# Patient Record
Sex: MALE | Race: WHITE | NOT HISPANIC OR LATINO | Employment: FULL TIME | URBAN - METROPOLITAN AREA
[De-identification: names, ages, dates, MRNs, and addresses within clinical notes are randomized per-mention and may not be internally consistent; named-entity substitution may affect disease eponyms.]

---

## 2024-07-19 ENCOUNTER — HOSPITAL ENCOUNTER (EMERGENCY)
Facility: HOSPITAL | Age: 64
Discharge: HOME | End: 2024-07-19
Attending: EMERGENCY MEDICINE

## 2024-07-19 VITALS
OXYGEN SATURATION: 97 % | RESPIRATION RATE: 17 BRPM | HEART RATE: 62 BPM | BODY MASS INDEX: 28 KG/M2 | DIASTOLIC BLOOD PRESSURE: 73 MMHG | HEIGHT: 70 IN | TEMPERATURE: 98 F | WEIGHT: 195.56 LBS | SYSTOLIC BLOOD PRESSURE: 118 MMHG

## 2024-07-19 DIAGNOSIS — R19.7 NAUSEA VOMITING AND DIARRHEA: Primary | ICD-10-CM

## 2024-07-19 DIAGNOSIS — R11.2 NAUSEA VOMITING AND DIARRHEA: Primary | ICD-10-CM

## 2024-07-19 DIAGNOSIS — R42 DIZZINESS: ICD-10-CM

## 2024-07-19 LAB
ALBUMIN SERPL BCP-MCNC: 3.6 G/DL (ref 3.5–5.2)
ALP SERPL-CCNC: 54 U/L (ref 55–135)
ALT SERPL W/O P-5'-P-CCNC: 31 U/L (ref 10–44)
ANION GAP SERPL CALC-SCNC: 10 MMOL/L (ref 8–16)
AST SERPL-CCNC: 37 U/L (ref 10–40)
BACTERIA #/AREA URNS HPF: NORMAL /HPF
BASOPHILS # BLD AUTO: 0.01 K/UL (ref 0–0.2)
BASOPHILS NFR BLD: 0.2 % (ref 0–1.9)
BILIRUB SERPL-MCNC: 0.6 MG/DL (ref 0.1–1)
BILIRUB UR QL STRIP: NEGATIVE
BUN SERPL-MCNC: 12 MG/DL (ref 8–23)
CALCIUM SERPL-MCNC: 9 MG/DL (ref 8.7–10.5)
CHLORIDE SERPL-SCNC: 103 MMOL/L (ref 95–110)
CK SERPL-CCNC: 394 U/L (ref 20–200)
CLARITY UR: CLEAR
CO2 SERPL-SCNC: 21 MMOL/L (ref 23–29)
COLOR UR: YELLOW
CREAT SERPL-MCNC: 0.8 MG/DL (ref 0.5–1.4)
DIFFERENTIAL METHOD BLD: ABNORMAL
EOSINOPHIL # BLD AUTO: 0 K/UL (ref 0–0.5)
EOSINOPHIL NFR BLD: 0.2 % (ref 0–8)
ERYTHROCYTE [DISTWIDTH] IN BLOOD BY AUTOMATED COUNT: 13.2 % (ref 11.5–14.5)
EST. GFR  (NO RACE VARIABLE): >60 ML/MIN/1.73 M^2
GLUCOSE SERPL-MCNC: 174 MG/DL (ref 70–110)
GLUCOSE UR QL STRIP: NEGATIVE
HCT VFR BLD AUTO: 40.9 % (ref 40–54)
HGB BLD-MCNC: 13.7 G/DL (ref 14–18)
HGB UR QL STRIP: NEGATIVE
HYALINE CASTS #/AREA URNS LPF: 0 /LPF
IMM GRANULOCYTES # BLD AUTO: 0.02 K/UL (ref 0–0.04)
IMM GRANULOCYTES NFR BLD AUTO: 0.4 % (ref 0–0.5)
KETONES UR QL STRIP: NEGATIVE
LEUKOCYTE ESTERASE UR QL STRIP: NEGATIVE
LYMPHOCYTES # BLD AUTO: 0.8 K/UL (ref 1–4.8)
LYMPHOCYTES NFR BLD: 15.6 % (ref 18–48)
MCH RBC QN AUTO: 30.9 PG (ref 27–31)
MCHC RBC AUTO-ENTMCNC: 33.5 G/DL (ref 32–36)
MCV RBC AUTO: 92 FL (ref 82–98)
MICROSCOPIC COMMENT: NORMAL
MONOCYTES # BLD AUTO: 0.4 K/UL (ref 0.3–1)
MONOCYTES NFR BLD: 8.2 % (ref 4–15)
NEUTROPHILS # BLD AUTO: 3.7 K/UL (ref 1.8–7.7)
NEUTROPHILS NFR BLD: 75.4 % (ref 38–73)
NITRITE UR QL STRIP: NEGATIVE
NRBC BLD-RTO: 0 /100 WBC
PH UR STRIP: 7 [PH] (ref 5–8)
PLATELET # BLD AUTO: 150 K/UL (ref 150–450)
PMV BLD AUTO: 10.8 FL (ref 9.2–12.9)
POTASSIUM SERPL-SCNC: 4.4 MMOL/L (ref 3.5–5.1)
PROT SERPL-MCNC: 6.6 G/DL (ref 6–8.4)
PROT UR QL STRIP: ABNORMAL
RBC # BLD AUTO: 4.44 M/UL (ref 4.6–6.2)
RBC #/AREA URNS HPF: 0 /HPF (ref 0–4)
SODIUM SERPL-SCNC: 134 MMOL/L (ref 136–145)
SP GR UR STRIP: >1.03 (ref 1–1.03)
URN SPEC COLLECT METH UR: ABNORMAL
UROBILINOGEN UR STRIP-ACNC: NEGATIVE EU/DL
WBC # BLD AUTO: 4.87 K/UL (ref 3.9–12.7)
WBC #/AREA URNS HPF: 0 /HPF (ref 0–5)

## 2024-07-19 PROCEDURE — 63600175 PHARM REV CODE 636 W HCPCS: Performed by: NURSE PRACTITIONER

## 2024-07-19 PROCEDURE — 81000 URINALYSIS NONAUTO W/SCOPE: CPT | Performed by: NURSE PRACTITIONER

## 2024-07-19 PROCEDURE — 85025 COMPLETE CBC W/AUTO DIFF WBC: CPT | Performed by: NURSE PRACTITIONER

## 2024-07-19 PROCEDURE — 80053 COMPREHEN METABOLIC PANEL: CPT | Performed by: NURSE PRACTITIONER

## 2024-07-19 PROCEDURE — 99285 EMERGENCY DEPT VISIT HI MDM: CPT | Mod: 25

## 2024-07-19 PROCEDURE — 96374 THER/PROPH/DIAG INJ IV PUSH: CPT

## 2024-07-19 PROCEDURE — 25000003 PHARM REV CODE 250: Performed by: NURSE PRACTITIONER

## 2024-07-19 PROCEDURE — 82550 ASSAY OF CK (CPK): CPT | Performed by: NURSE PRACTITIONER

## 2024-07-19 PROCEDURE — 96361 HYDRATE IV INFUSION ADD-ON: CPT

## 2024-07-19 RX ORDER — BETAMETHASONE DIPROPIONATE 0.5 MG/G
1 OINTMENT, AUGMENTED TOPICAL 2 TIMES DAILY
COMMUNITY
Start: 2024-04-24

## 2024-07-19 RX ORDER — ALBUTEROL SULFATE 90 UG/1
AEROSOL, METERED RESPIRATORY (INHALATION)
COMMUNITY

## 2024-07-19 RX ORDER — TRETINOIN 0.5 MG/G
CREAM TOPICAL
COMMUNITY
Start: 2024-04-12

## 2024-07-19 RX ORDER — LISINOPRIL 40 MG/1
40 TABLET ORAL
COMMUNITY

## 2024-07-19 RX ORDER — SILDENAFIL CITRATE 20 MG/1
TABLET ORAL
COMMUNITY
Start: 2024-06-18

## 2024-07-19 RX ORDER — ONDANSETRON 4 MG/1
4 TABLET, ORALLY DISINTEGRATING ORAL EVERY 6 HOURS PRN
Qty: 12 TABLET | Refills: 0 | Status: SHIPPED | OUTPATIENT
Start: 2024-07-19

## 2024-07-19 RX ORDER — NITROGLYCERIN 0.4 MG/1
0.4 TABLET SUBLINGUAL EVERY 5 MIN PRN
COMMUNITY
Start: 2023-12-07

## 2024-07-19 RX ORDER — ALFUZOSIN HYDROCHLORIDE 10 MG/1
10 TABLET, EXTENDED RELEASE ORAL
COMMUNITY
Start: 2024-05-13

## 2024-07-19 RX ORDER — VILOXAZINE HYDROCHLORIDE 200 MG/1
1 CAPSULE, EXTENDED RELEASE ORAL
COMMUNITY
Start: 2024-06-20

## 2024-07-19 RX ORDER — ONDANSETRON HYDROCHLORIDE 2 MG/ML
4 INJECTION, SOLUTION INTRAVENOUS
Status: COMPLETED | OUTPATIENT
Start: 2024-07-19 | End: 2024-07-19

## 2024-07-19 RX ADMIN — ONDANSETRON 4 MG: 2 INJECTION INTRAMUSCULAR; INTRAVENOUS at 04:07

## 2024-07-19 RX ADMIN — SODIUM CHLORIDE 1000 ML: 9 INJECTION, SOLUTION INTRAVENOUS at 04:07

## 2024-07-19 NOTE — FIRST PROVIDER EVALUATION
Medical screening examination initiated.  I have conducted a focused provider triage encounter, findings are as follows:    Brief history of present illness:  64-year-old male presents the emergency department with nausea and vomiting.  Patient reports he has been working out in the heat.  Patient denies any chest pain or shortness of breath    There were no vitals filed for this visit.    Pertinent physical exam:  No acute distress    Brief workup plan:  Labs, fluids, meds, further eval    Preliminary workup initiated; this workup will be continued and followed by the physician or advanced practice provider that is assigned to the patient when roomed.

## 2024-07-19 NOTE — Clinical Note
"Luis Gunter" Geovany was seen and treated in our emergency department on 7/19/2024.  He may return to work on 07/21/2024.       If you have any questions or concerns, please don't hesitate to call.      Mirian Goodwin, DO"

## 2024-07-19 NOTE — ED PROVIDER NOTES
Emergency Medicine Provider Note - 7/19/2024       SCRIBE NOTE: I, Marsha Kilgore am scribing for, and in the presence of, * Mirian Goodwin DO, FACEP     History     Chief Complaint   Patient presents with    General Illness     Pt. C/o general weakness for the past 5-6 hours, with vomiting. Pt states he feels dehydrated. Pt denies chest pain, SOB, abd pain, and diarrhea        Allergies:  Review of patient's allergies indicates:   Allergen Reactions    Nickel Rash        History of Present Illness   HPI    7/19/2024, 6:11 PM  The history is provided by the patient    Luis Armenta is a 64 y.o. male presenting to the ED for nausea, vomiting, diarrhea, generalized weakness.  Patient reports that he has been suffering from diarrhea almost every day in the morning pre past month..  He denies any melena or hematochezia.  Patient reports that he is normally from [place] and has been in the [place] area working on the [employer] stating.  He attributes the daily diarrhea to change diet.  Last night he went to bed was not feeling well.  This morning he had several episodes of emesis as well as diarrhea.  Again no melena, hematochezia, or hematemesis.  He felt dizzy.  Dizziness was aggravated by sitting up, better with lying down.  There was no numbness or weakness to 1 side, slurred speech, difficulty talking, difficulty swallowing.  Felt like he had blurred vision.  Patient denied any chest pain, chest pressure, indigestion, cough, dysuria, urgency, frequency.  Patient had waxing waning abdominal pain which is now resolved.  Patient's prior history of hernia surgery.  Patient states that he is only on medication for ADHD.    Reviewed scan:  CT Abdomen Pelvis With IV Contrast  Order: [id number]  Impression    .         .    No evidence of inguinal hernia. Hyperdense fluid in the right inguinal canal possibly representing residual postoperative changes.    Large right hydrocele.    Nonspecific 0.5 cm  noncalcified solid nodule in the right middle lobe. If patient is of high clinical risk, consider further evaluation with dedicated chest CT.    Bilateral renal cysts and small nonobstructing stones as described above.    Released By: HANSA SHAH MD  2/23/2024 11:23 AM  Narrative    EXAM:  [facility] CT ABDOMEN/PELVIS WITH IV CONTRAST(CREATININE DRAW IF NEEDED)    CLINICAL INDICATION: K40.90 (Unilateral inguinal hernia, without obstruction or gangrene, not specified as recurrent) .    TECHNIQUE: Following IV administration of 97 mL of Omnipaque 350, CT scan of the abdomen and pelvis with multiplanar reformatted images generated from the data set. Dose reduction techniques were utilized.    COMPARISON: No comparison studies are available at this time.    FINDINGS:    Lower chest: There is a 0.5 cm noncalcified solid nodule in the right middle lobe (series 204, image 9).    Liver: Normal in appearance.    Gallbladder: Normal.    Pancreas: Normal.    Spleen: Normal.    Adrenals: Normal.    Kidneys: There are several right renal cysts, the largest of which measures 2.1 cm in the lower pole. There is a 0.3 cm nonobstructing stone in the lower pole the right kidney. There is a 0.3 cm nonobstructing stone in the lower pole the left kidney.  There is a 0.3 cm nonobstructing stone in the left mid kidney.  There is a 1.6 cm cysts in the lower pole the left kidney.    Gastrointestinal: The stomach and small bowel are unremarkable . There is fecal retention throughout the colon and rectum. The appendix is not identified, but there is no evidence of inflammation.    Pelvis: No free fluid or mass. The urinary bladder is decompressed but question mild circumferential wall thickening of the urinary bladder. There is small amount of hyperdense fluid in the right inguinal canal. There is a large right-sided hydrocele.    Vasculature: Scattered atherosclerotic calcifications of the abdominal aorta and iliac arteries. There is no aneurysmal  dilatation. The visceral arteries are patent. The portal and splenic veins are patent.    Lymph nodes: No enlarged lymph nodes.    Bones: No destructive osseous lesion.  Exam End: 02/22/24 16:03    Specimen Collected: 02/23/24 10:07 Last Resulted: 02/23/24 10:23   Received From: [facility]  Result Received: 07/19/24 14:38     Reviewed 1st provider evaluation:  Medical screening examination initiated.  I have conducted a focused provider triage encounter, findings are as follows:     Brief history of present illness:  64-year-old male presents the emergency department with nausea and vomiting.  Patient reports he has been working out in the heat.  Patient denies any chest pain or shortness of breath     Vitals   There were no vitals filed for this visit.        Pertinent physical exam:  No acute distress     Brief workup plan:  Labs, fluids, meds, further eval     Preliminary workup initiated; this workup will be continued and followed by the physician or advanced practice provider that is assigned to the patient when roomed.    Arrival mode: Private Vehicle     PCP: [place], Primary Doctor     Past Medical History:  Past Medical History:   Diagnosis Date    Cardiovascular disease     COPD (chronic obstructive pulmonary disease)     Coronary artery disease     Essential (primary) hypertension        Past Surgical History:  Past Surgical History:   Procedure Laterality Date    CARDIAC CATHETERIZATION      HERNIA REPAIR       Family History:  No family history on file.    Social History:  Social History     Tobacco Use    Smoking status: Former     Types: Cigarettes    Smokeless tobacco: Not on file   Substance and Sexual Activity    Alcohol use: Not on file    Drug use: Not on file    Sexual activity: Not on file        Review of Systems   Review of Systems   Constitutional:  Negative for diaphoresis and fever.   Respiratory:  Negative for cough, chest tightness and shortness of breath.    Cardiovascular:   Negative for chest pain.   Gastrointestinal:  Positive for abdominal pain (Waxing and wanin, currently resolved.), diarrhea, nausea and vomiting. Negative for abdominal distention.   Genitourinary:  Positive for frequency. Negative for dysuria.   Musculoskeletal:  Negative for back pain.   Skin:  Negative for rash.   Neurological:  Positive for light-headedness (Worse when standing up, better laying down.) and headaches. Negative for dizziness, syncope, speech difficulty and weakness.   Hematological:  Does not bruise/bleed easily.        Physical Exam     Initial Vitals [07/19/24 1458]   BP Pulse Resp Temp SpO2   (!) 147/79 68 18 98.5 °F (36.9 °C) 100 %      MAP       --          Physical Exam    Nursing Notes and Vital Signs Reviewed.  Patient on cardiac monitor.  Sinus rhythm noted.  Constitutional: Patient is in no apparent distress. Well-developed and well-nourished.  There is no diaphoresis.  Head: Atraumatic. Normocephalic.  Eyes: PERRL. EOM intact. Conjunctivae are not pale. No scleral icterus.  ENT: Mucous membranes are moist. Oropharynx is clear and symmetric.    Neck: Supple. Full ROM. No lymphadenopathy.  Cardiovascular: Regular rate. Regular rhythm. No murmurs, rubs, or gallops. Distal pulses are 2+ and symmetric.  Pulmonary/Chest: No respiratory distress. Clear to auscultation bilaterally. No wheezing or rales.  Abdominal: Soft and non-distended.  There is no tenderness.  No rebound, guarding, or rigidity. Good bowel sounds.  Musculoskeletal: Moves all extremities. No obvious deformities. No edema. No calf tenderness.  Skin: Warm and dry.  Neurological:  Alert, awake, and appropriate.  Normal speech.  No acute focal neurological deficits are appreciated.  Negative pronator drift.  Finger-to-nose intact.   Negative heel drop.  No pass pointing.  Psychiatric: Normal affect. Good eye contact. Appropriate in content.     ED Course   ED Procedures:  Procedures    ED Vital Signs:  Vitals:    07/19/24 1458  "07/19/24 1609 07/19/24 1644 07/19/24 1730   BP: (!) 147/79 122/74  (!) 132/90   Pulse: 68 (S) (!) 53  63   Resp: 18 18  17   Temp: 98.5 °F (36.9 °C) 98.1 °F (36.7 °C)     TempSrc: Oral Oral     SpO2: 100% 100%  97%   Weight: 84.3 kg (185 lb 12.8 oz)  88.7 kg (195 lb 8.8 oz)    Height:   5' 10" (1.778 m)     07/19/24 1800 07/19/24 1820 07/19/24 1850 07/19/24 1852   BP: 110/75 110/75 113/68 125/77   Pulse: (!) 52 (!) 51 (!) 49 (!) 50   Resp: 18 18     Temp:       TempSrc:       SpO2: 98% 99% 99% 97%   Weight:       Height:        07/19/24 1854 07/19/24 2047   BP: 136/82 118/73   Pulse: (!) 58 62   Resp:  17   Temp:  98 °F (36.7 °C)   TempSrc:  Oral   SpO2: 97%    Weight:     Height:         Abnormal Lab Results:  Labs Reviewed   CBC W/ AUTO DIFFERENTIAL - Abnormal       Result Value    WBC 4.87      RBC 4.44 (*)     Hemoglobin 13.7 (*)     Hematocrit 40.9      MCV 92      MCH 30.9      MCHC 33.5      RDW 13.2      Platelets 150      MPV 10.8      Immature Granulocytes 0.4      Gran # (ANC) 3.7      Immature Grans (Abs) 0.02      Lymph # 0.8 (*)     Mono # 0.4      Eos # 0.0      Baso # 0.01      nRBC 0      Gran % 75.4 (*)     Lymph % 15.6 (*)     Mono % 8.2      Eosinophil % 0.2      Basophil % 0.2      Differential Method Automated     COMPREHENSIVE METABOLIC PANEL - Abnormal    Sodium 134 (*)     Potassium 4.4      Chloride 103      CO2 21 (*)     Glucose 174 (*)     BUN 12      Creatinine 0.8      Calcium 9.0      Total Protein 6.6      Albumin 3.6      Total Bilirubin 0.6      Alkaline Phosphatase 54 (*)     AST 37      ALT 31      eGFR >60      Anion Gap 10     URINALYSIS, REFLEX TO URINE CULTURE - Abnormal    Specimen UA Urine, Clean Catch      Color, UA Yellow      Appearance, UA Clear      pH, UA 7.0      Specific Gravity, UA >1.030 (*)     Protein, UA 1+ (*)     Glucose, UA Negative      Ketones, UA Negative      Bilirubin (UA) Negative      Occult Blood UA Negative      Nitrite, UA Negative      " Urobilinogen, UA Negative      Leukocytes, UA Negative      Narrative:     Specimen Source->Urine   CK - Abnormal     (*)    URINALYSIS MICROSCOPIC    RBC, UA 0      WBC, UA 0      Bacteria None      Hyaline Casts, UA 0      Microscopic Comment SEE COMMENT      Narrative:     Specimen Source->Urine        All Lab Results:  Results for orders placed or performed during the hospital encounter of 07/19/24   CBC auto differential   Result Value Ref Range    WBC 4.87 3.90 - 12.70 K/uL    RBC 4.44 (L) 4.60 - 6.20 M/uL    Hemoglobin 13.7 (L) 14.0 - 18.0 g/dL    Hematocrit 40.9 40.0 - 54.0 %    MCV 92 82 - 98 fL    MCH 30.9 27.0 - 31.0 pg    MCHC 33.5 32.0 - 36.0 g/dL    RDW 13.2 11.5 - 14.5 %    Platelets 150 150 - 450 K/uL    MPV 10.8 9.2 - 12.9 fL    Immature Granulocytes 0.4 0.0 - 0.5 %    Gran # (ANC) 3.7 1.8 - 7.7 K/uL    Immature Grans (Abs) 0.02 0.00 - 0.04 K/uL    Lymph # 0.8 (L) 1.0 - 4.8 K/uL    Mono # 0.4 0.3 - 1.0 K/uL    Eos # 0.0 0.0 - 0.5 K/uL    Baso # 0.01 0.00 - 0.20 K/uL    nRBC 0 0 /100 WBC    Gran % 75.4 (H) 38.0 - 73.0 %    Lymph % 15.6 (L) 18.0 - 48.0 %    Mono % 8.2 4.0 - 15.0 %    Eosinophil % 0.2 0.0 - 8.0 %    Basophil % 0.2 0.0 - 1.9 %    Differential Method Automated    Comprehensive metabolic panel   Result Value Ref Range    Sodium 134 (L) 136 - 145 mmol/L    Potassium 4.4 3.5 - 5.1 mmol/L    Chloride 103 95 - 110 mmol/L    CO2 21 (L) 23 - 29 mmol/L    Glucose 174 (H) 70 - 110 mg/dL    BUN 12 8 - 23 mg/dL    Creatinine 0.8 0.5 - 1.4 mg/dL    Calcium 9.0 8.7 - 10.5 mg/dL    Total Protein 6.6 6.0 - 8.4 g/dL    Albumin 3.6 3.5 - 5.2 g/dL    Total Bilirubin 0.6 0.1 - 1.0 mg/dL    Alkaline Phosphatase 54 (L) 55 - 135 U/L    AST 37 10 - 40 U/L    ALT 31 10 - 44 U/L    eGFR >60 >60 mL/min/1.73 m^2    Anion Gap 10 8 - 16 mmol/L   Urinalysis, Reflex to Urine Culture Urine, Clean Catch    Specimen: Urine   Result Value Ref Range    Specimen UA Urine, Clean Catch     Color, UA Yellow Yellow,  Straw, Melissa    Appearance, UA Clear Clear    pH, UA 7.0 5.0 - 8.0    Specific Gravity, UA >1.030 (A) 1.005 - 1.030    Protein, UA 1+ (A) Negative    Glucose, UA Negative Negative    Ketones, UA Negative Negative    Bilirubin (UA) Negative Negative    Occult Blood UA Negative Negative    Nitrite, UA Negative Negative    Urobilinogen, UA Negative <2.0 EU/dL    Leukocytes, UA Negative Negative   CPK   Result Value Ref Range     (H) 20 - 200 U/L   Urinalysis Microscopic   Result Value Ref Range    RBC, UA 0 0 - 4 /hpf    WBC, UA 0 0 - 5 /hpf    Bacteria None None-Occ /hpf    Hyaline Casts, UA 0 0-1/lpf /lpf    Microscopic Comment SEE COMMENT           Imaging Results:  Imaging Results              CT Head Without Contrast (Final result)  Result time 07/19/24 19:54:25      Final result by Hay Canchola MD (07/19/24 19:54:25)                   Impression:      No acute abnormality.    Age-related atrophy    All CT scans   are performed using dose optimization techniques including the following: automated exposure control; adjustment of the mA and/or kV; use of iterative reconstruction technique.  Dose modulation was employed for ALARA by means of: Automated exposure control; adjustment of the mA and/or kV according to patient size (this includes techniques or standardized protocols for targeted exams where dose is matched to indication/reason for exam; i.e. extremities or head); and/or use of iterative reconstructive technique.      Electronically signed by: Hay Canchola  Date:    07/19/2024  Time:    19:54               Narrative:    EXAMINATION:  CT HEAD WITHOUT CONTRAST    CLINICAL HISTORY:  Dizziness, persistent/recurrent, cardiac or vascular cause suspected; Dizziness and giddiness    TECHNIQUE:  Low dose axial CT images obtained throughout the head without intravenous contrast. Sagittal and coronal reconstructions were performed.    COMPARISON:  None.    FINDINGS:  Age-related atrophy.    Ventricles and  "sulci are normal in size for age without evidence of hydrocephalus. No extra-axial blood or fluid collections.    No parenchymal mass, hemorrhage, edema or major vascular distribution infarct.    Skull/extracranial contents (limited evaluation): No fracture. Mastoid air cells and paranasal sinuses are essentially clear.                                            The Emergency Provider reviewed the vital signs and test results, which are outlined above.     ED Discussion   ED Medication(s):  Medications   sodium chloride 0.9% bolus 1,000 mL 1,000 mL (0 mLs Intravenous Stopped 7/19/24 1839)   ondansetron injection 4 mg (4 mg Intravenous Given 7/19/24 1637)       ED Course as of 07/20/24 0657   Fri Jul 19, 2024 2002 Repeat abdominal exam soft no rebound or guarding no masses.  Patient able to ambulate unassisted down the hallway.  Return precautions given.  I discussed my recommendation to avoid heights while dizzy [LB]   2045 Patient eating ankur crackers and drinking water.  [LB]   2047 Patient was  smiling and briskly walking down the hallway carrying a backpack over his shoulder to the ED exit. There were no signs of ataxia.   Patient waved to the examiner and said, "Thanks, doc." [LB]      ED Course User Index  [LB] Mirian Goodwin, DO     20:02  Reassessment: Dr. Goodwin reassessed the pt.  The pt is resting comfortably and is NAD.  Pt states their sx have improved. Discussed test results, shared treatment plan, specific conditions for return, and the need for f/u.  Answered their questions at this time.  Pt understands and agrees to the plan.  The pt has remained hemodynamically stable through ED course and is stable for discharge.    I discussed with patient and/or family/caretaker that evaluation in the ED does not suggest any emergent or life threatening medical conditions requiring immediate intervention beyond what was provided in the ED, and I believe patient is safe for discharge.  Regardless, an " unremarkable evaluation in the ED does not preclude the development or presence of a serious of life threatening condition. As such, patient was instructed to return immediately for any worsening or change in current symptoms.    Patient's headache is consistent with previous headaches and lacks features concerning for emergent or life threatening condition.  I do not suspect SAH, meningitis, increased IC pressure, infectious, toxic, vascular, CNS, or other EMC.  I have discussed this at length with patient.  Regarding treatment, advised patient to take nonsteroidal antiinflammatory medications, acetaminophen, or any medications prescribed as instructed.  To prevent headaches, patient advised to avoid muscle tension (do not stay in one position for long periods of time), avoid eye strain (make sure there is adequate lighting for reading and routine tasks), eat healthy foods, exercise, and do not smoke or drink excessive alcohol.  Patient also advised to avoid overuse of over-the-counter or prescription medications. Patient was instructed to contact primary healthcare provider if: headaches continue to get worse; occur often enough that they affect daily work or normal activities; frequent medication use is needed to manage headaches; headaches that worsen and cause vomiting; or there are any questions or concerns about the condition or care. Advised patient to return to the emergency department or call 911 if they develop a sudden headache that presents suddenly and much worse than usual headaches; have difficulty seeing, speaking, or moving; become confused or have seizure activity; or develop a fever and stiff neck with the headache.     Regarding ABDOMINAL PAIN, I recommended that the patient: Sip water or other clear fluids; avoid solid food for the first few hours after vomiting or diarrhea; if vomiting, wait 6 hours, and then eat small amounts of mild foods such as rice, applesauce, or crackers; avoid dairy  products; avoid citrus, high-fat foods, fried or greasy foods, tomato products, caffeine, alcohol, and carbonated beverages;  avoid aspirin, ibuprofen or other anti-inflammatory medications, and narcotic pain medications unless prescribed.  In regards to prevention, I encouraged patient to:  Avoid fatty or greasy foods; drink plenty of water each day; eat small meals more frequently; exercise regularly; limit foods that produce gas; make sure meals are well-balanced and high in fiber and include plenty of fruits and vegetables.    Regarding DIZZINESS, I advised patient to avoid sudden changes in posture, get up from a lying position slowly, and stay seated for a few moments before standing; and when standing, make sure you have something to hold on to.  Also encouraged patient to avoid activities such as driving, operating heavy machinery, and climbing until 1 week after symptoms disappear. Patient instructed to drink plenty of fluids and to eat several healthy, low fat meals throughout the day.    Take frequent sips of Pedialyte, Powerade, Gatorade.  Popsicles.  [place] diet, bananas, breads, rice.  Avoid red sauces, fruit juices, and dairy products as can irritate your stomach.  If drinking water, be sure to have something salty such as crackers to help restore electrolytes.  Return to emergency department for: Weakness, passing out, blood in stool, blood in vomit, fever, worsening abdominal pain, or other concerns.         MIPS Measures     Smoker? Former     Hypertension: History of Hypertension: The patient has elevated blood pressure (higher than 120/80) while being treated in the ED but has a history of hypertension.     Medical Decision Making                 Medical Decision Making  Differential diagnosis: Acute kidney injury, dehydration, gastroenteritis, electrolyte abnormality, .    ED course: Patient has been complaining of diarrhea for a month.  Patient complained emesis with the ED visit.  There  was no  associated chest pain, chest pressure, indigestion reported.  Cardiac monitoring showed sinus rhythm.  EKG considered, but not obtained as patient denied any chest pain, chest pressure, indigestion. There were no secondary symptoms of diaphoresis or shortness of breath.   Patient's symptomatology of dizziness worse with standing up better lying down it is more consistent with dehydration.  Urinalysis supports this with a specific gravity 1.030.   Patient has been working outside in high ambient temperatures.  On exam.  No acute neurologic deficits.  CT of head obtained as patient had  reported headache:  No acute abnormality.  Age-related atrophy.   Sodium 134.  Glucose 174.  Liver enzymes normal.  CPK obtained as patient is working out side in ambient temperatures.  .  Urinalysis negative blood.  WBC 4.87.  Hemoglobin/hematocrit 13.7/40.9.  Patient given 4 mg of Zofran 1 L normal saline.  Orthostatics negative.  Regarding patient's abdominal pain: Patient had benign abdominal exam.  Patient's symptomatology of abdominal pain had resolved in the emergency department.  Reviewed prior CT scan abdomen:  Inguinal hernia.  Hyperdense fluid in the right inguinal canal.  No aneurysm.  Patient did report continued dizziness, but there was no neurologic deficits.  Patient was able to briskly walk down the [place] emergency department without ataxia.  Return precautions given including chest pain, chest pressure, indigestion, numbness or weakness to 1 side, slurred speech.    Amount and/or Complexity of Data Reviewed  External Data Reviewed: notes.     Details: See HPI.  Labs: ordered. Decision-making details documented in ED Course.  Radiology: ordered. Decision-making details documented in ED Course.    Risk  Prescription drug management.        Coding    Prescription Management: I performed a review of the patient's current Rx medication list as input by nursing staff.    Discharge Medication List as of  "2024  8:07 PM        START taking these medications    Details   ondansetron (ZOFRAN-ODT) 4 MG TbDL Take 1 tablet (4 mg total) by mouth every 6 (six) hours as needed., Starting 2024, Print           CONTINUE these medications which have NOT CHANGED    Details   lisinopriL (PRINIVIL,ZESTRIL) 40 MG tablet Take 40 mg by mouth., Historical Med      nitroGLYCERIN (NITROSTAT) 0.4 MG SL tablet Place 0.4 mg under the tongue every 5 (five) minutes as needed., Starting 2023, Historical Med      QELBREE 200 mg Cp24 Take 1 capsule by mouth., Starting 2024, Historical Med      sildenafil (REVATIO) 20 mg Tab SMARTSI-5 Tablet(s) By Mouth Daily PRN, Historical Med      tretinoin (RETIN-A) 0.05 % cream SMARTSI Topical Daily, Historical Med      albuterol (PROVENTIL/VENTOLIN HFA) 90 mcg/actuation inhaler INHALE TWO PUFFS BY MOUTH EVERY 4 HOURS AS NEEDED FOR WHEEZING, Historical Med      alfuzosin (UROXATRAL) 10 mg Tb24 Take 10 mg by mouth., Starting 2024, Historical Med      augmented betamethasone dipropionate (DIPROLENE-AF) 0.05 % ointment Apply 1 application  topically 2 (two) times daily., Starting 2024, Historical Med              Discussed case with:N/A    Referrals:  No orders of the defined types were placed in this encounter.      Portions of this note may have been created with voice recognition software. Occasional "wrong-word" or "sound-a-like" substitutions may have occurred due to the inherent limitations of voice recognition software. Please, read the note carefully and recognize, using context, where substitutions have occurred.          Clinical Impression       ICD-10-CM ICD-9-CM   1. Nausea vomiting and diarrhea  R11.2 787.91    R19.7 787.01   2. Dizziness  R42 780.4         ED Disposition     Disposition: Discharge to home  Patient condition: Stable    ED Follow-up   Follow-up Information       [facility]    Why: Return to emergency department " for: Numbness or weakness to 1 side, severe headache, slurred speech, loss of vision, chest pain, chest pressure, shortness of breath, severe abdominal pain, or other concerns  Contact information:  [address] [facility] [address] [phone]                               Scribe Attestation:   Scribe #1: I performed the above scribed service and the documentation accurately describes the services I performed. I attest to the accuracy of the note.     Attending:   Physician Attestation Statement for Scribe #1: IIMirian DO, [facility], personally performed the services described in this documentation, as scribed by Marsha Kilgore , in my presence, and it is both accurate and complete.                 Miiran Goodwin DO  07/20/24 [id number]

## 2024-07-20 NOTE — DISCHARGE INSTRUCTIONS
Take frequent sips of Pedialyte, Powerade, Gatorade.  Popsicles.  [place] diet, bananas, breads, rice.  Avoid red sauces, fruit juices, and dairy products as can irritate your stomach.  If drinking water, be sure to have something salty such as crackers to help restore electrolytes.  Return to emergency department for: Weakness, passing out, blood in stool, blood in vomit, fever, worsening abdominal pain, or other concerns.